# Patient Record
Sex: MALE | Race: WHITE
[De-identification: names, ages, dates, MRNs, and addresses within clinical notes are randomized per-mention and may not be internally consistent; named-entity substitution may affect disease eponyms.]

---

## 2018-09-05 PROBLEM — Z00.00 ENCOUNTER FOR PREVENTIVE HEALTH EXAMINATION: Status: ACTIVE | Noted: 2018-09-05

## 2018-09-10 ENCOUNTER — RX RENEWAL (OUTPATIENT)
Age: 83
End: 2018-09-10

## 2018-09-10 ENCOUNTER — APPOINTMENT (OUTPATIENT)
Dept: OTOLARYNGOLOGY | Facility: CLINIC | Age: 83
End: 2018-09-10
Payer: MEDICARE

## 2018-09-10 VITALS — HEIGHT: 72 IN | BODY MASS INDEX: 32.51 KG/M2 | WEIGHT: 240 LBS

## 2018-09-10 DIAGNOSIS — Z80.9 FAMILY HISTORY OF MALIGNANT NEOPLASM, UNSPECIFIED: ICD-10-CM

## 2018-09-10 DIAGNOSIS — Z87.09 PERSONAL HISTORY OF OTHER DISEASES OF THE RESPIRATORY SYSTEM: ICD-10-CM

## 2018-09-10 DIAGNOSIS — K21.9 ACUTE LARYNGITIS: ICD-10-CM

## 2018-09-10 DIAGNOSIS — Z87.898 PERSONAL HISTORY OF OTHER SPECIFIED CONDITIONS: ICD-10-CM

## 2018-09-10 DIAGNOSIS — Z83.3 FAMILY HISTORY OF DIABETES MELLITUS: ICD-10-CM

## 2018-09-10 DIAGNOSIS — Z82.2 FAMILY HISTORY OF DEAFNESS AND HEARING LOSS: ICD-10-CM

## 2018-09-10 DIAGNOSIS — Z78.9 OTHER SPECIFIED HEALTH STATUS: ICD-10-CM

## 2018-09-10 DIAGNOSIS — R49.0 DYSPHONIA: ICD-10-CM

## 2018-09-10 DIAGNOSIS — J04.0 ACUTE LARYNGITIS: ICD-10-CM

## 2018-09-10 DIAGNOSIS — Z82.49 FAMILY HISTORY OF ISCHEMIC HEART DISEASE AND OTHER DISEASES OF THE CIRCULATORY SYSTEM: ICD-10-CM

## 2018-09-10 DIAGNOSIS — R19.6 HALITOSIS: ICD-10-CM

## 2018-09-10 DIAGNOSIS — J34.89 OTHER SPECIFIED DISORDERS OF NOSE AND NASAL SINUSES: ICD-10-CM

## 2018-09-10 PROCEDURE — 92557 COMPREHENSIVE HEARING TEST: CPT

## 2018-09-10 PROCEDURE — 99204 OFFICE O/P NEW MOD 45 MIN: CPT | Mod: 25

## 2018-09-10 PROCEDURE — 92550 TYMPANOMETRY & REFLEX THRESH: CPT

## 2018-09-10 PROCEDURE — 31231 NASAL ENDOSCOPY DX: CPT

## 2018-09-10 RX ORDER — ATORVASTATIN CALCIUM 80 MG/1
TABLET, FILM COATED ORAL
Refills: 0 | Status: ACTIVE | COMMUNITY

## 2018-09-10 RX ORDER — DOXAZOSIN 8 MG/1
TABLET ORAL
Refills: 0 | Status: ACTIVE | COMMUNITY

## 2018-09-10 RX ORDER — SULFAMETHOXAZOLE AND TRIMETHOPRIM 800; 160 MG/1; MG/1
800-160 TABLET ORAL
Qty: 20 | Refills: 0 | Status: ACTIVE | COMMUNITY
Start: 2018-09-10 | End: 1900-01-01

## 2018-09-10 RX ORDER — PROPRANOLOL HYDROCHLORIDE 80 MG/1
TABLET ORAL
Refills: 0 | Status: ACTIVE | COMMUNITY

## 2018-09-10 RX ORDER — FLUTICASONE PROPIONATE 50 UG/1
50 SPRAY, METERED NASAL DAILY
Qty: 48 | Refills: 3 | Status: ACTIVE | COMMUNITY
Start: 2018-09-10 | End: 1900-01-01

## 2018-09-12 ENCOUNTER — APPOINTMENT (OUTPATIENT)
Dept: OTOLARYNGOLOGY | Facility: CLINIC | Age: 83
End: 2018-09-12
Payer: MEDICARE

## 2018-09-12 VITALS
DIASTOLIC BLOOD PRESSURE: 65 MMHG | OXYGEN SATURATION: 96 % | SYSTOLIC BLOOD PRESSURE: 111 MMHG | HEART RATE: 54 BPM | RESPIRATION RATE: 16 BRPM | TEMPERATURE: 97.7 F

## 2018-09-12 DIAGNOSIS — J32.2 CHRONIC ETHMOIDAL SINUSITIS: ICD-10-CM

## 2018-09-12 PROCEDURE — 99214 OFFICE O/P EST MOD 30 MIN: CPT

## 2018-09-26 ENCOUNTER — APPOINTMENT (OUTPATIENT)
Dept: OTOLARYNGOLOGY | Facility: CLINIC | Age: 83
End: 2018-09-26
Payer: MEDICARE

## 2018-09-26 VITALS — OXYGEN SATURATION: 96 % | DIASTOLIC BLOOD PRESSURE: 66 MMHG | SYSTOLIC BLOOD PRESSURE: 103 MMHG | HEART RATE: 73 BPM

## 2018-09-26 DIAGNOSIS — J32.0 CHRONIC MAXILLARY SINUSITIS: ICD-10-CM

## 2018-09-26 PROCEDURE — 99214 OFFICE O/P EST MOD 30 MIN: CPT

## 2020-04-29 ENCOUNTER — APPOINTMENT (OUTPATIENT)
Dept: OTOLARYNGOLOGY | Facility: CLINIC | Age: 85
End: 2020-04-29
Payer: MEDICARE

## 2020-04-29 DIAGNOSIS — H90.3 SENSORINEURAL HEARING LOSS, BILATERAL: ICD-10-CM

## 2020-04-29 PROCEDURE — 99441: CPT

## 2020-05-11 ENCOUNTER — APPOINTMENT (OUTPATIENT)
Dept: OTOLARYNGOLOGY | Facility: CLINIC | Age: 85
End: 2020-05-11
Payer: MEDICARE

## 2020-05-11 VITALS
DIASTOLIC BLOOD PRESSURE: 70 MMHG | TEMPERATURE: 97.5 F | OXYGEN SATURATION: 96 % | HEART RATE: 59 BPM | SYSTOLIC BLOOD PRESSURE: 110 MMHG

## 2020-05-11 DIAGNOSIS — H90.3 SENSORINEURAL HEARING LOSS, BILATERAL: ICD-10-CM

## 2020-05-11 DIAGNOSIS — H91.93 UNSPECIFIED HEARING LOSS, BILATERAL: ICD-10-CM

## 2020-05-11 PROCEDURE — 99213 OFFICE O/P EST LOW 20 MIN: CPT

## 2020-05-11 PROCEDURE — 92550 TYMPANOMETRY & REFLEX THRESH: CPT

## 2020-05-11 PROCEDURE — 92557 COMPREHENSIVE HEARING TEST: CPT

## 2020-05-11 NOTE — HISTORY OF PRESENT ILLNESS
[de-identified] : 85M who was previously seen for sinusitis, who presents with changes to his hearing in the last 2-3 years with worsening in the last 6 months on the right. He describes the hearing change as an "out of tune" when listening to music in certain pitches and can describe the exact pitches. He denies any otalgia, otorrhea, hearing LOSS, tinnitus, vertigo. Denies a causative event; the change has been gradual. Denies vertigo. No pertinent FH. + smoker and 6= cups of caffeine use

## 2020-05-11 NOTE — PHYSICAL EXAM
[Midline] : trachea located in midline position [de-identified] : mild cerumen in bilateral EACs, removed with curet [de-identified] : elongated uvula [Normal] : extraocular movements are normal

## 2020-05-11 NOTE — ASSESSMENT
[FreeTextEntry1] : 85M who presents with change in his hearing x 2-3 years with recent worsening, audiogram shows ***\par \par Plan:\par -

## 2020-05-11 NOTE — REVIEW OF SYSTEMS
[As Noted in HPI] : as noted in HPI [Negative] : Constitutional [FreeTextEntry1] : all other ROS negative

## 2020-05-18 ENCOUNTER — APPOINTMENT (OUTPATIENT)
Dept: OTOLARYNGOLOGY | Facility: CLINIC | Age: 85
End: 2020-05-18
Payer: MEDICARE

## 2020-05-18 VITALS
BODY MASS INDEX: 31.83 KG/M2 | SYSTOLIC BLOOD PRESSURE: 126 MMHG | RESPIRATION RATE: 17 BRPM | OXYGEN SATURATION: 95 % | HEART RATE: 68 BPM | WEIGHT: 235 LBS | TEMPERATURE: 97.3 F | DIASTOLIC BLOOD PRESSURE: 75 MMHG | HEIGHT: 72 IN

## 2020-05-18 DIAGNOSIS — H90.41 SENSORINEURAL HEARING LOSS, UNILATERAL, RIGHT EAR, WITH UNRESTRICTED HEARING ON THE CONTRALATERAL SIDE: ICD-10-CM

## 2020-05-18 PROCEDURE — 99213 OFFICE O/P EST LOW 20 MIN: CPT

## 2020-05-18 NOTE — HISTORY OF PRESENT ILLNESS
[de-identified] : 85M being seen for worsening/change in his hearing over last 6 months. Patient describes it as music being "out of tune". He denies any otalgia, otorrhea, tinnitus, vertiginous symptoms. He had an audiogram on last appointment which showed asymmetric hearing loss for which we sent him for brain MRI which was negative for acoustic neuroma, other brain tumors or stroke. Today, he reports continued "out of tune" sensation, but no other complaints.

## 2020-05-18 NOTE — REVIEW OF SYSTEMS
[As Noted in HPI] : as noted in HPI [Negative] : Constitutional [Patient Intake Form Reviewed] : Patient intake form was reviewed [FreeTextEntry1] : all other ROS negative

## 2020-05-18 NOTE — DATA REVIEWED
[de-identified] : MRi brain/IAC- no acoustic neuroma, other tumors or stroke mild t2 signal abnormality and sinus congestion (improved from 2018 ct) discussed with pt

## 2020-05-18 NOTE — PHYSICAL EXAM
[Midline] : trachea located in midline position [Normal] : assessment of respiratory effort is normal [de-identified] : elongated uvula

## 2020-05-18 NOTE — ASSESSMENT
[FreeTextEntry1] : 85M who presents with change in hearing, found to have asymmetric hearing loss with brain MRI as described above. \par \par Plan:\par - recommend return to [facility] where he gets his hearing aids, for adjustment - explained he still may have trouble but to try it - our office does not have ability to adjust [employer] hearing aids\par - recommend follow up in 6month-1yr for repeat hearing test

## 2021-02-13 ENCOUNTER — NON-APPOINTMENT (OUTPATIENT)
Age: 86
End: 2021-02-13

## 2021-02-16 ENCOUNTER — NON-APPOINTMENT (OUTPATIENT)
Age: 86
End: 2021-02-16

## 2021-02-28 ENCOUNTER — TRANSCRIPTION ENCOUNTER (OUTPATIENT)
Age: 86
End: 2021-02-28

## 2021-04-05 ENCOUNTER — TRANSCRIPTION ENCOUNTER (OUTPATIENT)
Age: 86
End: 2021-04-05

## 2022-04-11 PROBLEM — J04.0 REFLUX LARYNGITIS: Status: ACTIVE | Noted: 2018-09-10
